# Patient Record
Sex: MALE | Race: WHITE | NOT HISPANIC OR LATINO | URBAN - METROPOLITAN AREA
[De-identification: names, ages, dates, MRNs, and addresses within clinical notes are randomized per-mention and may not be internally consistent; named-entity substitution may affect disease eponyms.]

---

## 2023-11-25 ENCOUNTER — CLAIMS CREATED FROM THE CLAIM WINDOW (OUTPATIENT)
Dept: URBAN - METROPOLITAN AREA MEDICAL CENTER 25 | Facility: MEDICAL CENTER | Age: 33
End: 2023-11-25

## 2023-11-25 ENCOUNTER — CLAIMS CREATED FROM THE CLAIM WINDOW (OUTPATIENT)
Dept: URBAN - METROPOLITAN AREA MEDICAL CENTER 25 | Facility: MEDICAL CENTER | Age: 33
End: 2023-11-25
Payer: COMMERCIAL

## 2023-11-25 DIAGNOSIS — R93.3 ABN FINDINGS-GI TRACT: ICD-10-CM

## 2023-11-25 DIAGNOSIS — R63.4 ABNORMAL INTENTIONAL WEIGHT LOSS: ICD-10-CM

## 2023-11-25 DIAGNOSIS — K50.90 ABDOMINAL PAIN DESPITE THERAPY FOR CROHN'S DISEASE: ICD-10-CM

## 2023-11-25 PROCEDURE — 99254 IP/OBS CNSLTJ NEW/EST MOD 60: CPT | Performed by: INTERNAL MEDICINE

## 2023-11-26 ENCOUNTER — CLAIMS CREATED FROM THE CLAIM WINDOW (OUTPATIENT)
Dept: URBAN - METROPOLITAN AREA MEDICAL CENTER 25 | Facility: MEDICAL CENTER | Age: 33
End: 2023-11-26

## 2023-11-26 ENCOUNTER — CLAIMS CREATED FROM THE CLAIM WINDOW (OUTPATIENT)
Dept: URBAN - METROPOLITAN AREA MEDICAL CENTER 25 | Facility: MEDICAL CENTER | Age: 33
End: 2023-11-26
Payer: COMMERCIAL

## 2023-11-26 DIAGNOSIS — K50.90 ABDOMINAL PAIN DESPITE THERAPY FOR CROHN'S DISEASE: ICD-10-CM

## 2023-11-26 DIAGNOSIS — K86.89 ACUTE PANCREATIC FLUID COLLECTION: ICD-10-CM

## 2023-11-26 PROCEDURE — 99232 SBSQ HOSP IP/OBS MODERATE 35: CPT | Performed by: INTERNAL MEDICINE

## 2023-11-28 ENCOUNTER — CLAIMS CREATED FROM THE CLAIM WINDOW (OUTPATIENT)
Dept: URBAN - METROPOLITAN AREA MEDICAL CENTER 25 | Facility: MEDICAL CENTER | Age: 33
End: 2023-11-28
Payer: COMMERCIAL

## 2023-11-28 DIAGNOSIS — C25.2 CANCER OF PANCREAS, TAIL: ICD-10-CM

## 2023-11-28 DIAGNOSIS — R59.0 ABDOMINAL LYMPHADENOPATHY: ICD-10-CM

## 2023-11-28 DIAGNOSIS — K86.89 ACUTE PANCREATIC FLUID COLLECTION: ICD-10-CM

## 2023-11-28 PROCEDURE — 43242 EGD US FINE NEEDLE BX/ASPIR: CPT | Performed by: INTERNAL MEDICINE

## 2023-11-29 ENCOUNTER — CLAIMS CREATED FROM THE CLAIM WINDOW (OUTPATIENT)
Dept: URBAN - METROPOLITAN AREA MEDICAL CENTER 25 | Facility: MEDICAL CENTER | Age: 33
End: 2023-11-29
Payer: COMMERCIAL

## 2023-11-29 ENCOUNTER — CLAIMS CREATED FROM THE CLAIM WINDOW (OUTPATIENT)
Dept: URBAN - METROPOLITAN AREA MEDICAL CENTER 25 | Facility: MEDICAL CENTER | Age: 33
End: 2023-11-29

## 2023-11-29 DIAGNOSIS — K76.89 ABNORMAL FINDING ON LIVER FUNCTION: ICD-10-CM

## 2023-11-29 DIAGNOSIS — K86.89 ACUTE PANCREATIC FLUID COLLECTION: ICD-10-CM

## 2023-11-29 PROCEDURE — 99232 SBSQ HOSP IP/OBS MODERATE 35: CPT | Performed by: PHYSICIAN ASSISTANT

## 2023-11-29 PROCEDURE — 99232 SBSQ HOSP IP/OBS MODERATE 35: CPT | Performed by: STUDENT IN AN ORGANIZED HEALTH CARE EDUCATION/TRAINING PROGRAM

## 2023-12-21 ENCOUNTER — CLAIMS CREATED FROM THE CLAIM WINDOW (OUTPATIENT)
Dept: URBAN - METROPOLITAN AREA CLINIC 19 | Facility: CLINIC | Age: 33
End: 2023-12-21
Payer: COMMERCIAL

## 2023-12-21 VITALS
OXYGEN SATURATION: 100 % | DIASTOLIC BLOOD PRESSURE: 80 MMHG | SYSTOLIC BLOOD PRESSURE: 130 MMHG | BODY MASS INDEX: 18.75 KG/M2 | HEIGHT: 70 IN | HEART RATE: 95 BPM | TEMPERATURE: 99.7 F | WEIGHT: 131 LBS

## 2023-12-21 DIAGNOSIS — K50.90 CROHN DISEASE: ICD-10-CM

## 2023-12-21 DIAGNOSIS — C25.9 PANCREATIC CANCER METASTASIZED TO LIVER: ICD-10-CM

## 2023-12-21 DIAGNOSIS — K50.80 CROHN'S COLITIS: ICD-10-CM

## 2023-12-21 DIAGNOSIS — Z09 HOSPITAL DISCHARGE FOLLOW-UP: ICD-10-CM

## 2023-12-21 DIAGNOSIS — B17.10 ACUTE HEPATITIS C: ICD-10-CM

## 2023-12-21 PROCEDURE — 99214 OFFICE O/P EST MOD 30 MIN: CPT | Performed by: NURSE PRACTITIONER

## 2023-12-21 RX ORDER — ATROPINE SULFATE 0.05 MG/ML
AS DIRECTED INJECTION, SOLUTION ENDOTRACHEAL; INTRAMUSCULAR; INTRAVENOUS; SUBCUTANEOUS
Status: ACTIVE | COMMUNITY

## 2023-12-21 RX ORDER — PROMETHAZINE HYDROCHLORIDE 25 MG/1
1 TABLET AS NEEDED TABLET ORAL
Status: ACTIVE | COMMUNITY

## 2023-12-21 RX ORDER — HYDROMORPHONE HYDROCHLORIDE 4 MG/1
1 TABLET AS NEEDED TABLET ORAL
Status: ACTIVE | COMMUNITY

## 2023-12-21 RX ORDER — NYSTATIN 500000 [USP'U]/1
1 TABLET TABLET, FILM COATED ORAL
Status: ACTIVE | COMMUNITY

## 2023-12-21 RX ORDER — PROCHLORPERAZINE MALEATE 10 MG
1 TABLET AS NEEDED TABLET ORAL THREE TIMES A DAY
Status: ACTIVE | COMMUNITY

## 2023-12-21 NOTE — HPI-TODAY'S VISIT:
Mr. Alcaraz is a 33-year-old male with PMH of Crohn's disease who presents today for hospital follow-up.   Admitted to [facility] 11/25/2023 - 12/6/2023 presenting with left-sided abdominal pain with nausea and vomiting.  Pain also in epigastric region and some radiation to the groin.  Have been ongoing for past 6 to 8 months and worsening.  Also reported 15 pound weight loss over 6 months.  Upon arrival to ED found with elevation of LFTs, T. bili was normal.  CBC was normal.  Lipase normal.  CT A/P showed 4 cm x 3 cm lesion in the tail of the pancreas.  Some low-attenuation lesions in the liver and left pelvis noted.  No prior history of malignancy. He has a he has history of Crohn's disease, was on Remicade in the past but had been off medications for a long time.  Reports Crohn's has been under control.  Reported his last treatment with Remicade was when he was under the care of a pediatric GI. CT chest showed 5 mm nodule within the right upper lobe and 3 mm nodule within the right middle lobe  IR liver biopsy 11/27/2023 - pathology c/w adenocarcinoma He had an EUS with Dr. Gallego on 11/28/2023-normal esophagus, normal stomach, normal duodenum. Mass was identified in the pancreatic tail. Tissue obtained from this exam and pathology results reveal adenocarcinoma. This was staged as T2 N1 MX by endosonographic criteria.  A few enlarged lymph nodes were visualized in the peripancreatic region.  S/p Chemo-Port placement 11/30/2023 S/p celiac block on 12/1/2023 Pain management per palliative care on Dilaudid and methadone Oncology started chemotherapy with FOLFIRINOX first dose starting 12/3/2023 Following with Dr. Yolanda Buckner Hep C AB+ with viral load 11.8mill copies; genotype 2 -- remote history of IV drug abuse.

## 2023-12-22 LAB
A/G RATIO: 1.2
ABSOLUTE BASOPHILS: 32
ABSOLUTE EOSINOPHILS: 42
ABSOLUTE LYMPHOCYTES: 991
ABSOLUTE MONOCYTES: 58
ABSOLUTE NEUTROPHILS: 4176
ALBUMIN: 4
ALKALINE PHOSPHATASE: 109
ALT (SGPT): 45
AST (SGOT): 32
BASOPHILS: 0.6
BILIRUBIN, TOTAL: 0.5
BUN/CREATININE RATIO: (no result)
BUN: 17
C-REACTIVE PROTEIN, QUANT: 2.7
CALCIUM: 9.1
CARBON DIOXIDE, TOTAL: 28
CHLORIDE: 97
CREATININE: 0.75
EGFR: 122
EOSINOPHILS: 0.8
GLOBULIN, TOTAL: 3.4
GLUCOSE: 204
HEMATOCRIT: 40.9
HEMOGLOBIN: 14.2
INR: 1
LYMPHOCYTES: 18.7
MCH: 29.6
MCHC: 34.7
MCV: 85.2
MONOCYTES: 1.1
MPV: 11.3
NEUTROPHILS: 78.8
PLATELET COUNT: 131
POTASSIUM: 3.8
PROTEIN, TOTAL: 7.4
PT: 10.8
RDW: 11.9
RED BLOOD CELL COUNT: 4.8
SODIUM: 134
WHITE BLOOD CELL COUNT: 5.3

## 2023-12-23 ENCOUNTER — TELEPHONE ENCOUNTER (OUTPATIENT)
Dept: URBAN - METROPOLITAN AREA CLINIC 19 | Facility: CLINIC | Age: 33
End: 2023-12-23

## 2023-12-29 ENCOUNTER — TELEPHONE ENCOUNTER (OUTPATIENT)
Dept: URBAN - METROPOLITAN AREA CLINIC 19 | Facility: CLINIC | Age: 33
End: 2023-12-29

## 2024-01-18 LAB — CALPROTECTIN, FECAL: 55

## 2024-03-18 ENCOUNTER — OV NP (OUTPATIENT)
Dept: URBAN - METROPOLITAN AREA CLINIC 19 | Facility: CLINIC | Age: 34
End: 2024-03-18

## 2024-04-11 ENCOUNTER — OV EP (OUTPATIENT)
Dept: URBAN - METROPOLITAN AREA CLINIC 19 | Facility: CLINIC | Age: 34
End: 2024-04-11

## 2024-04-11 RX ORDER — PROCHLORPERAZINE MALEATE 10 MG
1 TABLET AS NEEDED TABLET ORAL THREE TIMES A DAY
Status: ACTIVE | COMMUNITY

## 2024-04-11 RX ORDER — HYDROMORPHONE HYDROCHLORIDE 4 MG/1
1 TABLET AS NEEDED TABLET ORAL
Status: ACTIVE | COMMUNITY

## 2024-04-11 RX ORDER — PROMETHAZINE HYDROCHLORIDE 25 MG/1
1 TABLET AS NEEDED TABLET ORAL
Status: ACTIVE | COMMUNITY

## 2024-04-11 RX ORDER — ATROPINE SULFATE 0.05 MG/ML
AS DIRECTED INJECTION, SOLUTION ENDOTRACHEAL; INTRAMUSCULAR; INTRAVENOUS; SUBCUTANEOUS
Status: ACTIVE | COMMUNITY

## 2024-04-11 RX ORDER — NYSTATIN 500000 [USP'U]/1
1 TABLET TABLET, FILM COATED ORAL
Status: ACTIVE | COMMUNITY

## 2024-09-12 ENCOUNTER — DASHBOARD ENCOUNTERS (OUTPATIENT)
Age: 34
End: 2024-09-12

## 2024-09-12 ENCOUNTER — OFFICE VISIT (OUTPATIENT)
Dept: URBAN - METROPOLITAN AREA CLINIC 19 | Facility: CLINIC | Age: 34
End: 2024-09-12

## 2024-09-12 ENCOUNTER — TELEPHONE ENCOUNTER (OUTPATIENT)
Dept: URBAN - METROPOLITAN AREA CLINIC 19 | Facility: CLINIC | Age: 34
End: 2024-09-12

## 2024-09-12 VITALS — WEIGHT: 153.2 LBS | HEIGHT: 70 IN | BODY MASS INDEX: 21.93 KG/M2 | TEMPERATURE: 98.5 F

## 2024-09-12 DIAGNOSIS — B18.2 CHRONIC HEPATITIS C WITHOUT HEPATIC COMA: ICD-10-CM

## 2024-09-12 PROBLEM — 128302006: Status: ACTIVE | Noted: 2024-09-12

## 2024-09-12 PROCEDURE — 99203 OFFICE O/P NEW LOW 30 MIN: CPT | Performed by: STUDENT IN AN ORGANIZED HEALTH CARE EDUCATION/TRAINING PROGRAM

## 2024-09-12 RX ORDER — PROMETHAZINE HYDROCHLORIDE 25 MG/1
1 TABLET AS NEEDED TABLET ORAL
Status: ACTIVE | COMMUNITY

## 2024-09-12 RX ORDER — NYSTATIN 500000 [USP'U]/1
1 TABLET TABLET, FILM COATED ORAL
Status: DISCONTINUED | COMMUNITY

## 2024-09-12 RX ORDER — HYDROMORPHONE HYDROCHLORIDE 4 MG/1
1 TABLET AS NEEDED TABLET ORAL
Status: DISCONTINUED | COMMUNITY

## 2024-09-12 RX ORDER — ATROPINE SULFATE 0.05 MG/ML
AS DIRECTED INJECTION, SOLUTION ENDOTRACHEAL; INTRAMUSCULAR; INTRAVENOUS; SUBCUTANEOUS
Status: DISCONTINUED | COMMUNITY

## 2024-09-12 RX ORDER — PROCHLORPERAZINE MALEATE 10 MG
1 TABLET AS NEEDED TABLET ORAL THREE TIMES A DAY
Status: DISCONTINUED | COMMUNITY

## 2024-09-12 NOTE — HPI-TODAY'S VISIT:
This is a 34-year-old male Crohn's disease currently not on any treatment, pancreatic adenocarcinoma on chemotherapy diagnosed on 11/28 2023, history of untreated hepatitis C, previous history of IV drug use.  Patient presents today from a referral from Dr. Mayer  for evaluation of hepatitis C tx.  Patient indicated that he has a history of IV drug use.  Patient was diagnosed with hepatitis C back in 2012 and has never been treated.  His recent labs from [facility] on 11/2023 have shown positive viral load, hepatitis  B core total neg, Ag (-), and surfance AB non equivical. Hep C viral load was [id number].  Currently patient is switching his chemotherapy because of as he indicated toxicity levels.  His last chemotherapy was on August 23 and he indicated that he will have his new chemotherapy to be given on the 26th today denies any nausea, vomiting and abdominal pain.  Labs 12/2023 T.bili 0.8    Alk phos  81   AST 50   ALT 76   Previous history EUS with FNA on 11/28 2023 positive for adenocarcinoma CT from 9/3/2024 showing new osteosclerotic metastases, stable nonspecific pulmonary nodules, several hepatic hypodensities identified in segment 7. New chemotherapy to be started on September 26

## 2024-10-25 ENCOUNTER — OFFICE VISIT (OUTPATIENT)
Dept: URBAN - METROPOLITAN AREA CLINIC 19 | Facility: CLINIC | Age: 34
End: 2024-10-25

## 2024-10-25 RX ORDER — PROMETHAZINE HYDROCHLORIDE 25 MG/1
1 TABLET AS NEEDED TABLET ORAL
Status: ACTIVE | COMMUNITY

## 2025-04-04 ENCOUNTER — LAB OUTSIDE AN ENCOUNTER (OUTPATIENT)
Dept: URBAN - METROPOLITAN AREA CLINIC 19 | Facility: CLINIC | Age: 35
End: 2025-04-04

## 2025-04-04 ENCOUNTER — CLAIMS CREATED FROM THE CLAIM WINDOW (OUTPATIENT)
Dept: URBAN - METROPOLITAN AREA MEDICAL CENTER 25 | Facility: MEDICAL CENTER | Age: 35
End: 2025-04-04
Payer: SELF-PAY

## 2025-04-04 DIAGNOSIS — R62.7 ADULT FAILURE TO THRIVE: ICD-10-CM

## 2025-04-04 DIAGNOSIS — R74.8 ELEVATED LIVER ENZYMES: ICD-10-CM

## 2025-04-04 DIAGNOSIS — C25.9 PANCREATIC CANCER: ICD-10-CM

## 2025-04-04 DIAGNOSIS — R63.4 WEIGHT LOSS: ICD-10-CM

## 2025-04-04 PROCEDURE — 99254 IP/OBS CNSLTJ NEW/EST MOD 60: CPT | Performed by: STUDENT IN AN ORGANIZED HEALTH CARE EDUCATION/TRAINING PROGRAM

## 2025-04-05 ENCOUNTER — CLAIMS CREATED FROM THE CLAIM WINDOW (OUTPATIENT)
Dept: URBAN - METROPOLITAN AREA MEDICAL CENTER 25 | Facility: MEDICAL CENTER | Age: 35
End: 2025-04-05
Payer: SELF-PAY

## 2025-04-05 DIAGNOSIS — C25.9 ADENOCARCINOMA OF PANCREAS: ICD-10-CM

## 2025-04-05 DIAGNOSIS — K76.89 OTHER SPECIFIED DISEASES OF LIVER: ICD-10-CM

## 2025-04-05 DIAGNOSIS — R93.3 ABN FINDINGS-GI TRACT: ICD-10-CM

## 2025-04-05 DIAGNOSIS — R11.0 NAUSEA: ICD-10-CM

## 2025-04-05 PROCEDURE — 99232 SBSQ HOSP IP/OBS MODERATE 35: CPT | Performed by: STUDENT IN AN ORGANIZED HEALTH CARE EDUCATION/TRAINING PROGRAM

## 2025-04-07 ENCOUNTER — LAB OUTSIDE AN ENCOUNTER (OUTPATIENT)
Dept: URBAN - METROPOLITAN AREA CLINIC 19 | Facility: CLINIC | Age: 35
End: 2025-04-07

## 2025-04-07 ENCOUNTER — CLAIMS CREATED FROM THE CLAIM WINDOW (OUTPATIENT)
Dept: URBAN - METROPOLITAN AREA MEDICAL CENTER 25 | Facility: MEDICAL CENTER | Age: 35
End: 2025-04-07
Payer: SELF-PAY

## 2025-04-07 DIAGNOSIS — K59.89: ICD-10-CM

## 2025-04-07 DIAGNOSIS — R11.2 NAUSEA AND VOMITING: ICD-10-CM

## 2025-04-07 DIAGNOSIS — B37.81 CANDIDA ESOPHAGITIS: ICD-10-CM

## 2025-04-07 PROCEDURE — 43235 EGD DIAGNOSTIC BRUSH WASH: CPT | Performed by: STUDENT IN AN ORGANIZED HEALTH CARE EDUCATION/TRAINING PROGRAM

## 2025-04-08 ENCOUNTER — LAB OUTSIDE AN ENCOUNTER (OUTPATIENT)
Dept: URBAN - METROPOLITAN AREA CLINIC 19 | Facility: CLINIC | Age: 35
End: 2025-04-08

## 2025-04-08 ENCOUNTER — CLAIMS CREATED FROM THE CLAIM WINDOW (OUTPATIENT)
Dept: URBAN - METROPOLITAN AREA MEDICAL CENTER 25 | Facility: MEDICAL CENTER | Age: 35
End: 2025-04-08
Payer: SELF-PAY

## 2025-04-08 DIAGNOSIS — R93.3 ABN FINDINGS-GI TRACT: ICD-10-CM

## 2025-04-08 DIAGNOSIS — R74.8 ELEVATED LIVER ENZYMES: ICD-10-CM

## 2025-04-08 DIAGNOSIS — C25.9 PANCREATIC CANCER: ICD-10-CM

## 2025-04-08 LAB
INR: 2
PERFORMING LAB: (no result)
PT: 23.3

## 2025-04-08 PROCEDURE — 99231 SBSQ HOSP IP/OBS SF/LOW 25: CPT | Performed by: STUDENT IN AN ORGANIZED HEALTH CARE EDUCATION/TRAINING PROGRAM

## 2025-04-09 ENCOUNTER — LAB OUTSIDE AN ENCOUNTER (OUTPATIENT)
Dept: URBAN - METROPOLITAN AREA CLINIC 19 | Facility: CLINIC | Age: 35
End: 2025-04-09

## 2025-04-25 ENCOUNTER — CLAIMS CREATED FROM THE CLAIM WINDOW (OUTPATIENT)
Dept: URBAN - METROPOLITAN AREA MEDICAL CENTER 25 | Facility: MEDICAL CENTER | Age: 35
End: 2025-04-25

## 2025-04-25 PROCEDURE — 99254 IP/OBS CNSLTJ NEW/EST MOD 60: CPT | Performed by: INTERNAL MEDICINE

## 2025-04-26 ENCOUNTER — CLAIMS CREATED FROM THE CLAIM WINDOW (OUTPATIENT)
Dept: URBAN - METROPOLITAN AREA MEDICAL CENTER 25 | Facility: MEDICAL CENTER | Age: 35
End: 2025-04-26

## 2025-04-26 PROCEDURE — 99232 SBSQ HOSP IP/OBS MODERATE 35: CPT | Performed by: INTERNAL MEDICINE
